# Patient Record
Sex: FEMALE | Race: BLACK OR AFRICAN AMERICAN | NOT HISPANIC OR LATINO | Employment: UNEMPLOYED | URBAN - METROPOLITAN AREA
[De-identification: names, ages, dates, MRNs, and addresses within clinical notes are randomized per-mention and may not be internally consistent; named-entity substitution may affect disease eponyms.]

---

## 2017-11-18 ENCOUNTER — APPOINTMENT (EMERGENCY)
Dept: RADIOLOGY | Facility: HOSPITAL | Age: 52
End: 2017-11-18

## 2017-11-18 ENCOUNTER — HOSPITAL ENCOUNTER (EMERGENCY)
Facility: HOSPITAL | Age: 52
Discharge: HOME | End: 2017-11-18
Attending: EMERGENCY MEDICINE | Admitting: EMERGENCY MEDICINE

## 2017-11-18 VITALS
DIASTOLIC BLOOD PRESSURE: 121 MMHG | OXYGEN SATURATION: 100 % | RESPIRATION RATE: 18 BRPM | HEIGHT: 70 IN | WEIGHT: 175 LBS | HEART RATE: 81 BPM | BODY MASS INDEX: 25.05 KG/M2 | TEMPERATURE: 98.1 F | SYSTOLIC BLOOD PRESSURE: 187 MMHG

## 2017-11-18 DIAGNOSIS — S99.912A ANKLE INJURY, LEFT, INITIAL ENCOUNTER: ICD-10-CM

## 2017-11-18 DIAGNOSIS — S89.92XA INJURY OF LEFT KNEE, INITIAL ENCOUNTER: ICD-10-CM
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
## 2017-11-18 DIAGNOSIS — S93.409A ANKLE SPRAIN: ICD-10-CM

## 2017-11-18 PROCEDURE — 99283 EMERGENCY DEPT VISIT LOW MDM: CPT

## 2017-11-18 PROCEDURE — 73564 X-RAY EXAM KNEE 4 OR MORE: CPT

## 2017-11-18 PROCEDURE — 73610 X-RAY EXAM OF ANKLE: CPT

## 2017-11-18 RX ORDER — TRAMADOL HYDROCHLORIDE 50 MG/1
50 TABLET ORAL EVERY 6 HOURS PRN
Qty: 30 TABLET | Refills: 0 | Status: SHIPPED | OUTPATIENT
Start: 2017-11-18 | End: 2017-11-28

## 2017-11-18 NOTE — ED PROVIDER NOTES
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 and we were coming down steps and startled by a big squarrel jumped out and I slipped and fell down a couple of steps and I think I twisted my left ankle and landed on my left knee and since limping and pain got worse yesterday and today, I called out work yesterday as a result too, and been icing and aleve that has been giving me diarrhea too"  Pt deny other injury including head, neck, chestwall, abdomen, hip, back, pelvis  Pt admits arthro paracentesis of ;eft knee 5 years ago for 3x for unknown etiology of effusion  Pt with mild effusion of left knee that she states swelling down since with icing  Left latteral malleolus with mild swelling and tenderness without vo sign  I urge pt to file for workman's comp but she appears reluctant about that  None       Past Medical History:   Diagnosis Date    [facility] ([facility])     PE [facility]pulmonary thromboembolism) ([facility] [address] )        History reviewed  No pertinent surgical history  History reviewed  No pertinent family history  I have reviewed and agree with the history as documented  Social History   Substance Use Topics    Smoking status: Never Smoker    Smokeless tobacco: Never Used    Alcohol use No        Review of Systems   Constitutional: Negative  HENT: Negative  Eyes: Negative  Respiratory: Negative  Cardiovascular: Negative  Gastrointestinal: Negative  Genitourinary: Negative  Musculoskeletal: Positive for arthralgias and joint swelling  Negative for neck pain  Skin: Negative  Neurological: Negative          Physical Exam  ED Triage Vitals [11/18/17 0830]   Temperature Pulse Respirations Blood Pressure SpO2   98 1 °F (36 7 °C) 96 16 (!) 168/114 100 % Temp Source Heart Rate Source Patient Position - Orthostatic VS BP Location FiO2 (%)   Oral Monitor Sitting Right arm --      Pain Score       8           Orthostatic Vital Signs  Vitals:    11/18/17 0830 11/18/17 0917 11/18/17 1104   BP: (!) 168/114  (!) 187/121   Pulse: 96 87 81   Patient Position - Orthostatic VS: Sitting Sitting Sitting       Physical Exam   Constitutional: She is oriented to person, place, and time  No distress  HENT:   Head: Normocephalic and atraumatic  Eyes: EOM are normal  Pupils are equal, round, and reactive to light  Neck: Normal range of motion  Neck supple  Pulmonary/Chest: Effort normal  No respiratory distress  Abdominal: Soft  There is no guarding  Musculoskeletal: She exhibits edema and tenderness  Legs:  Neurological: She is alert and oriented to person, place, and time  Skin: Skin is warm  She is not diaphoretic  ED Medications  Medications - No data to display    Diagnostic Studies  Results Reviewed     None                 XR ankle 3+ views LEFT   ED Interpretation by Royal Rafael PA-C (11/18 1140)   No fx      XR knee 4+ views left injury   ED Interpretation by Royal Rafael PA-C (11/18 1139)   No fx                 Procedures  Procedures       Phone Contacts  ED Phone Contact    ED Course  ED Course as of Nov 18 1144   Sat Nov 18, 2017   1040 XR knee 4+ views left injury   1128 XR knee 4+ views left injury                               MDM  Number of Diagnoses or Management Options  Diagnosis management comments: She states she has crutches from an old sports injury she will intend to use          Amount and/or Complexity of Data Reviewed  Tests in the radiology section of CPT®: ordered and reviewed      CritCare Time    Disposition  Final diagnoses:   Fall (on) (from) other stairs and steps, initial encounter   Injury of left knee, initial encounter   Ankle injury, left, initial encounter   Ankle sprain     Time reflects when diagnosis was documented in both MDM as applicable and the Disposition within this note     Time User Action Codes Description Comment    11/18/2017 11:41 AM Lin Theodros Add [H59  CERJ] Fall, initial encounter     11/18/2017 11:41 AM Lin Theodros Add [W10 8XXA] Fall (on) (from) other stairs and steps, initial encounter     11/18/2017 11:41 AM Lin Theodros Modify [W10 8XXA] Fall (on) (from) other stairs and steps, initial encounter     11/18/2017 11:41 AM Lin Theyudiros Remove [X79  TRGQ] Fall, initial encounter     11/18/2017 11:42 AM Lin Theodros Add [J14 34PU] Injury of left knee, initial encounter     11/18/2017 11:42 AM Lin Theodros Add [S99 912A] Ankle injury, left, initial encounter     11/18/2017 11:42 AM Lin Theyudiros Add [S93 409A] Ankle sprain       ED Disposition     ED Disposition Condition Comment    Discharge  [facility] discharge to home/self care  Condition at discharge: Stable        Follow-up Information    None       Patient's Medications    No medications on file     No discharge procedures on file      ED Provider  Electronically Signed by           Reggie Flores PA-C  11/18/17 9857

## 2017-11-18 NOTE — DISCHARGE INSTRUCTIONS
Ankle Sprain   WHAT YOU NEED TO KNOW:   An ankle sprain happens when 1 or more ligaments in your ankle joint stretch or tear  Ligaments are tough tissues that connect bones  Ligaments support your joints and keep your bones in place  DISCHARGE INSTRUCTIONS:   Return to the emergency department if:   · You have severe pain in your ankle  · Your foot or toes are cold or numb  · Your ankle becomes more weak or unstable (wobbly)  · You are unable to put any weight on your ankle or foot  · Your swelling has increased or returned  Contact your healthcare provider if:   · Your pain does not go away, even after treatment  · You have questions or concerns about your condition or care  Medicines: You may need any of the following:  · NSAIDs , such as ibuprofen, help decrease swelling, pain, and fever  This medicine is available with or without a doctor's order  NSAIDs can cause stomach bleeding or kidney problems in certain people  If you take blood thinner medicine, always ask your healthcare provider if NSAIDs are safe for you  Always read the medicine label and follow directions  · Acetaminophen  decreases pain  It is available without a doctor's order  Ask how much to take and how often to take it  Follow directions  Acetaminophen can cause liver damage if not taken correctly  · Prescription pain medicine  may be given  Ask how to take this medicine safely  · Take your medicine as directed  Contact your healthcare provider if you think your medicine is not helping or if you have side effects  Tell him or her if you are allergic to any medicine  Keep a list of the medicines, vitamins, and herbs you take  Include the amounts, and when and why you take them  Bring the list or the pill bottles to follow-up visits  Carry your medicine list with you in case of an emergency    Self care:   · Use support devices,  such as a brace, cast, or splint, may be needed to limit your movement and protect your joint  You may need to use crutches to decrease your pain as you move around  · Go to physical therapy as directed  A physical therapist teaches you exercises to help improve movement and strength, and to decrease pain  · Rest  your ankle so that it can heal  Return to normal activities as directed  · Apply ice on your ankle for 15 to 20 minutes every hour or as directed  Use an ice pack, or put crushed ice in a plastic bag  Cover it with a towel  Ice helps prevent tissue damage and decreases swelling and pain  · Compress  your ankle  Ask if you should wrap an elastic bandage around your injured ligament  An elastic bandage provides support and helps decrease swelling and movement so your joint can heal  Wear as long as directed  · Elevate  your ankle above the level of your heart as often as you can  This will help decrease swelling and pain  Prop your ankle on pillows or blankets to keep it elevated comfortably  Prevent another ankle sprain:   · Let your ankle heal   Find out how long your ligament needs to heal  Do not do any physical activity until your healthcare provider says it is okay  If you start activity too soon, you may develop a more serious injury  · Always warm up and stretch  before you exercise or play sports  · Use the right equipment  Always wear shoes that fit well and are made for the activity that you are doing  You may also need ankle supports, elbow and knee pads, or braces  Follow up with your healthcare provider as directed:  Write down your questions so you remember to ask them during your visits  © 2017 [address] Information is for End User's use only and may not be sold, redistributed or otherwise used for commercial purposes  All illustrations and images included in CareNotes® are the copyrighted property of [employer]  or [employer]  The above information is an  only   It is not intended as medical advice for individual conditions or treatments  Talk to your doctor, nurse or pharmacist before following any medical regimen to see if it is safe and effective for you  Tramadol (By mouth)   Tramadol (TRAM-a-dol)  Treats moderate to severe pain  This medicine is a narcotic pain reliever  Brand Name(s): ConZip, FusePaq Synapryn, Ryzolt, Ultram, Ultram ER, traMADol HCl   There may be other brand names for this medicine  When This Medicine Should Not Be Used: This medicine is not right for everyone  Do not use if you had an allergic reaction to tramadol or other narcotic medicine, or if you have stomach or bowel blockage (including paralytic ileus)  How to Use This Medicine:   Long Acting Capsule, Liquid, Tablet, Dissolving Tablet, Long Acting Tablet  · Take your medicine as directed  Your dose may need to be changed several times to find what works best for you  · Make sure your hands are dry before you handle the disintegrating tablet  Peel back the foil from the blister pack, then remove the tablet  Do not push the tablet through the foil  Place the tablet in your mouth  After it has melted, swallow or take a drink of water  · Swallow the extended-release tablet whole  Do not crush, break, or chew it  · Drink plenty of liquids to help avoid constipation  · This medicine should come with a Medication Guide  Ask your pharmacist for a copy if you do not have one  · Missed dose: Take a dose as soon as you remember  If it is almost time for your next dose, wait until then and take a regular dose  Do not take extra medicine to make up for a missed dose  · Store the medicine in a closed container at room temperature, away from heat, moisture, and direct light  Drugs and Foods to Avoid:   Ask your doctor or pharmacist before using any other medicine, including over-the-counter medicines, vitamins, and herbal products    · Do not use this medicine if you are using or have used an MAO inhibitor within the past 14 days  · Some medicines can affect how tramadol works  Tell your doctor if you are using any of the following:  ¨ Carbamazepine, cyclobenzaprine, digoxin, erythromycin, ketoconazole, lithium, mirtazapine, phenytoin, promethazine, rifampin, ritonavir, quinidine, or trazodone  ¨ Blood thinner (including warfarin)  ¨ Diuretic (water pill)  ¨ Medicine to treat depression (including bupropion, fluoxetine, paroxetine, quinidine)  ¨ Phenothiazine medicine  ¨ Triptan medicine for migraine headaches  · Tell your doctor if you use anything else that makes you sleepy  Some examples are allergy medicine, narcotic pain medicine, and alcohol  Tell your doctor if you are using a muscle relaxer  · Do not drink alcohol while you are using this medicine  Warnings While Using This Medicine:   · Tell your doctor if you are pregnant or breastfeeding, or if you have kidney disease, liver disease (including cirrhosis), gallstones, lung or breathing problems, pancreas problems, or a history of head injury, seizures, drug addiction, or depression or similar emotional problems  Tell your doctor if you have phenylketonuria  · This medicine may cause the following problems:  ¨ High risk of overdose, which can lead to death  ¨ Respiratory depression (serious breathing problem that can be life-threatening)  ¨ Serotonin syndrome (when used with certain medicines)  ¨ Unusual change in mood or behavior  · This medicine may make you dizzy, drowsy, or lightheaded  Do not drive or doing anything else that could be dangerous until you know how this medicine affects you  · This medicine can be habit-forming  Do not use more than your prescribed dose  Call your doctor if you think your medicine is not working  · Do not stop using this medicine suddenly  Your doctor will need to slowly decrease your dose before you stop it completely  · Tell any doctor or dentist who treats you that you are using this medicine    · This medicine may cause constipation, especially with long-term use  Ask your doctor if you should use a laxative to prevent and treat constipation  · This medicine could cause infertility  Talk with your doctor before using this medicine if you plan to have children  · Keep all medicine out of the reach of children  Never share your medicine with anyone  Possible Side Effects While Using This Medicine:   Call your doctor right away if you notice any of these side effects:  · Allergic reaction: Itching or hives, swelling in your face or hands, swelling or tingling in your mouth or throat, chest tightness, trouble breathing  · Anxiety, restlessness, fast heartbeat, fever, sweating, muscle spasms, nausea, vomiting, diarrhea, seeing or hearing things that are not there  · Blistering, peeling, red skin rash  · Blue lips, fingernails, or skin  · Extreme dizziness, drowsiness, or weakness, shallow breathing, slow heartbeat, seizures, and cold, clammy skin  · Lightheadedness, dizziness, fainting  · Seizures  · Unusual mood or behavior, thoughts of killing yourself or others  · Trouble breathing  If you notice these less serious side effects, talk with your doctor:   · Constipation, loss of appetite, stomach upset  · Dry mouth  · Headache  If you notice other side effects that you think are caused by this medicine, tell your doctor  Call your doctor for medical advice about side effects  You may report side effects to FDA at 9-893-FDA-2530  © 2017 [address] Information is for End User's use only and may not be sold, redistributed or otherwise used for commercial purposes  The above information is an  only  It is not intended as medical advice for individual conditions or treatments  Talk to your doctor, nurse or pharmacist before following any medical regimen to see if it is safe and effective for you

## 2017-12-15 ENCOUNTER — HOSPITAL ENCOUNTER (OUTPATIENT)
Facility: HOSPITAL | Age: 52
Setting detail: OBSERVATION
Discharge: HOME | End: 2017-12-16
Attending: EMERGENCY MEDICINE | Admitting: EMERGENCY MEDICINE
Payer: COMMERCIAL

## 2017-12-15 ENCOUNTER — APPOINTMENT (EMERGENCY)
Dept: RADIOLOGY | Facility: HOSPITAL | Age: 52
End: 2017-12-15
Payer: COMMERCIAL
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
## 2017-12-15 PROBLEM — J38.00 VOCAL CORD PARALYSIS: Status: ACTIVE | Noted: 2017-12-15

## 2017-12-15 LAB
BASE EXCESS BLDA CALC-SCNC: 2 MMOL/L (ref -2–3)
CA-I BLD-SCNC: 1.24 MMOL/L (ref 1.12–1.32)
GLUCOSE SERPL-MCNC: 101 MG/DL (ref 65–140)
HCO3 BLDA-SCNC: 28 MMOL/L (ref 24–30)
HCT VFR BLD CALC: 34 % (ref 34.8–46.1)
HGB BLDA-MCNC: 11.6 G/DL (ref 11.5–15.4)
HOLD SPECIMEN: NORMAL
PCO2 BLD: 29 MMOL/L (ref 21–32)
PCO2 BLD: 48.2 MM HG (ref 42–50)
PH BLD: 7.37 [PH] (ref 7.3–7.4)
PO2 BLD: 18 MM HG (ref 35–45)
POTASSIUM BLD-SCNC: 3.7 MMOL/L (ref 3.5–5.3)
SAO2 % BLD FROM PO2: 26 % (ref 95–98)
SODIUM BLD-SCNC: 146 MMOL/L (ref 136–145)
SPECIMEN SOURCE: ABNORMAL

## 2017-12-15 PROCEDURE — 84132 ASSAY OF SERUM POTASSIUM: CPT

## 2017-12-15 PROCEDURE — 71010 HB CHEST X-RAY 1 VIEW FRONTAL: CPT

## 2017-12-15 PROCEDURE — 85014 HEMATOCRIT: CPT

## 2017-12-15 PROCEDURE — 82803 BLOOD GASES ANY COMBINATION: CPT

## 2017-12-15 PROCEDURE — 84295 ASSAY OF SERUM SODIUM: CPT

## 2017-12-15 PROCEDURE — 74177 CT ABD & PELVIS W/CONTRAST: CPT

## 2017-12-15 PROCEDURE — 72125 CT NECK SPINE W/O DYE: CPT

## 2017-12-15 PROCEDURE — 71260 CT THORAX DX C+: CPT

## 2017-12-15 PROCEDURE — 82330 ASSAY OF CALCIUM: CPT

## 2017-12-15 PROCEDURE — 36415 COLL VENOUS BLD VENIPUNCTURE: CPT | Performed by: EMERGENCY MEDICINE

## 2017-12-15 PROCEDURE — 82947 ASSAY GLUCOSE BLOOD QUANT: CPT

## 2017-12-15 PROCEDURE — 96374 THER/PROPH/DIAG INJ IV PUSH: CPT

## 2017-12-15 PROCEDURE — 70450 CT HEAD/BRAIN W/O DYE: CPT

## 2017-12-15 PROCEDURE — 99285 EMERGENCY DEPT VISIT HI MDM: CPT

## 2017-12-15 RX ORDER — AMOXICILLIN 250 MG
2 CAPSULE ORAL DAILY
Status: DISCONTINUED | OUTPATIENT
Start: 2017-12-16 | End: 2017-12-16 | Stop reason: HOSPADM

## 2017-12-15 RX ORDER — METHOCARBAMOL 500 MG/1
500 TABLET, FILM COATED ORAL EVERY 6 HOURS SCHEDULED
Status: DISCONTINUED | OUTPATIENT
Start: 2017-12-15 | End: 2017-12-16 | Stop reason: HOSPADM

## 2017-12-15 RX ORDER — IBUPROFEN 600 MG/1
600 TABLET ORAL EVERY 6 HOURS SCHEDULED
Status: DISCONTINUED | OUTPATIENT
Start: 2017-12-15 | End: 2017-12-16 | Stop reason: HOSPADM

## 2017-12-15 RX ORDER — SODIUM CHLORIDE 9 MG/ML
125 INJECTION, SOLUTION INTRAVENOUS CONTINUOUS
Status: DISCONTINUED | OUTPATIENT
Start: 2017-12-15 | End: 2017-12-16 | Stop reason: HOSPADM

## 2017-12-15 RX ORDER — OXYCODONE HYDROCHLORIDE 5 MG/1
5 TABLET ORAL EVERY 4 HOURS PRN
Status: DISCONTINUED | OUTPATIENT
Start: 2017-12-15 | End: 2017-12-16 | Stop reason: HOSPADM

## 2017-12-15 RX ORDER — ACETAMINOPHEN 325 MG/1
650 TABLET ORAL EVERY 6 HOURS SCHEDULED
Status: DISCONTINUED | OUTPATIENT
Start: 2017-12-15 | End: 2017-12-16 | Stop reason: HOSPADM

## 2017-12-15 RX ORDER — ONDANSETRON 2 MG/ML
4 INJECTION INTRAMUSCULAR; INTRAVENOUS EVERY 4 HOURS PRN
Status: DISCONTINUED | OUTPATIENT
Start: 2017-12-15 | End: 2017-12-16 | Stop reason: HOSPADM

## 2017-12-15 RX ORDER — DOCUSATE SODIUM 100 MG/1
100 CAPSULE, LIQUID FILLED ORAL 2 TIMES DAILY
Status: DISCONTINUED | OUTPATIENT
Start: 2017-12-15 | End: 2017-12-16 | Stop reason: HOSPADM

## 2017-12-15 RX ADMIN — SODIUM CHLORIDE 125 ML/HR: 0.9 INJECTION, SOLUTION INTRAVENOUS at 19:15

## 2017-12-15 RX ADMIN — IOHEXOL 100 ML: 350 INJECTION, SOLUTION INTRAVENOUS at 17:05

## 2017-12-15 RX ADMIN — SODIUM CHLORIDE 1000 ML: 0.9 INJECTION, SOLUTION INTRAVENOUS at 16:51

## 2017-12-15 RX ADMIN — OXYCODONE HYDROCHLORIDE 5 MG: 5 TABLET ORAL at 20:29
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
## 2017-12-16 ENCOUNTER — GENERIC CONVERSION - ENCOUNTER (OUTPATIENT)
Dept: OTHER | Facility: OTHER | Age: 52
End: 2017-12-16

## 2017-12-16 VITALS
TEMPERATURE: 98.2 F | OXYGEN SATURATION: 100 % | RESPIRATION RATE: 16 BRPM | DIASTOLIC BLOOD PRESSURE: 95 MMHG | HEART RATE: 79 BPM | WEIGHT: 175 LBS | HEIGHT: 70 IN | BODY MASS INDEX: 25.05 KG/M2 | SYSTOLIC BLOOD PRESSURE: 133 MMHG

## 2017-12-16 PROBLEM — S06.0X9A CONCUSSION: Status: ACTIVE | Noted: 2017-12-16

## 2017-12-16 PROCEDURE — 97162 PT EVAL MOD COMPLEX 30 MIN: CPT

## 2017-12-16 PROCEDURE — G8988 SELF CARE GOAL STATUS: HCPCS

## 2017-12-16 PROCEDURE — G8987 SELF CARE CURRENT STATUS: HCPCS

## 2017-12-16 PROCEDURE — G8979 MOBILITY GOAL STATUS: HCPCS

## 2017-12-16 PROCEDURE — 97166 OT EVAL MOD COMPLEX 45 MIN: CPT

## 2017-12-16 PROCEDURE — G8978 MOBILITY CURRENT STATUS: HCPCS

## 2017-12-16 PROCEDURE — 97532 HB COGNITIVE SKILLS DEVELOPMENT: CPT

## 2017-12-16 PROCEDURE — 97530 THERAPEUTIC ACTIVITIES: CPT

## 2017-12-16 RX ORDER — BUTALBITAL, ACETAMINOPHEN AND CAFFEINE 50; 325; 40 MG/1; MG/1; MG/1
1 TABLET ORAL ONCE
Qty: 30 TABLET | Refills: 0 | Status: SHIPPED | OUTPATIENT
Start: 2017-12-16 | End: 2017-12-16

## 2017-12-16 RX ORDER — BUTALBITAL, ACETAMINOPHEN AND CAFFEINE 50; 325; 40 MG/1; MG/1; MG/1
1 TABLET ORAL ONCE
Status: DISCONTINUED | OUTPATIENT
Start: 2017-12-16 | End: 2017-12-16 | Stop reason: HOSPADM

## 2017-12-16 RX ADMIN — OXYCODONE HYDROCHLORIDE 5 MG: 5 TABLET ORAL at 13:03

## 2017-12-16 RX ADMIN — OXYCODONE HYDROCHLORIDE 5 MG: 5 TABLET ORAL at 04:01

## 2017-12-16 RX ADMIN — OXYCODONE HYDROCHLORIDE 5 MG: 5 TABLET ORAL at 08:22
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
## 2017-12-16 NOTE — ASSESSMENT & PLAN NOTE
-Back pain and muscular spasm, patient states that muscle relaxers have not worked for he in the past  Pain control with tylenol, ibuprofen
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
## 2017-12-16 NOTE — PLAN OF CARE
Problem: Potential for Falls  Goal: Patient will remain free of falls  INTERVENTIONS:  - Assess patient frequently for physical needs  -  Identify cognitive and physical deficits and behaviors that affect risk of falls    -  [place] fall precautions as indicated by assessment   - Educate patient/family on patient safety including physical limitations  - Instruct patient to call for assistance with activity based on assessment  - Modify environment to reduce risk of injury  - Consider OT/PT consult to assist with strengthening/mobility    Outcome: Progressing      Problem: PAIN - ADULT  Goal: Verbalizes/displays adequate comfort level or baseline comfort level  Interventions:  - Encourage patient to monitor pain and request assistance  - Assess pain using appropriate pain scale  - Administer analgesics based on type and severity of pain and evaluate response  - Implement non-pharmacological measures as appropriate and evaluate response  - Consider cultural and social influences on pain and pain management  - Notify physician/advanced practitioner if interventions unsuccessful or patient reports new pain   Outcome: Progressing      Problem: INFECTION - ADULT  Goal: Absence or prevention of progression during hospitalization  INTERVENTIONS:  - Assess and monitor for signs and symptoms of infection  - Monitor lab/diagnostic results  - Monitor all insertion sites, i e  indwelling lines, tubes, and drains  - Monitor endotracheal (as able) and nasal secretions for changes in amount and color  - [place] appropriate cooling/warming therapies per order  - Administer medications as ordered  - Instruct and encourage patient and family to use good hand hygiene technique  - Identify and instruct in appropriate isolation precautions for identified infection/condition   Outcome: Progressing      Problem: SAFETY ADULT  Goal: Maintain or return to baseline ADL function  INTERVENTIONS:  -  Assess patient's ability to carry out ADLs; assess patient's baseline for ADL function and identify physical deficits which impact ability to perform ADLs (bathing, care of mouth/teeth, toileting, grooming, dressing, etc )  - Assess/evaluate cause of self-care deficits   - Assess range of motion  - Assess patient's mobility; develop plan if impaired  - Assess patient's need for assistive devices and provide as appropriate  - Encourage maximum independence but intervene and supervise when necessary  ¯ Involve family in performance of ADLs  ¯ Assess for home care needs following discharge   ¯ Request OT consult to assist with ADL evaluation and planning for discharge  ¯ Provide patient education as appropriate   Outcome: Progressing    Goal: Maintain or return mobility status to optimal level  INTERVENTIONS:  - Assess patient's baseline mobility status (ambulation, transfers, stairs, etc )    - Identify cognitive and physical deficits and behaviors that affect mobility  - Identify mobility aids required to assist with transfers and/or ambulation (gait belt, sit-to-stand, lift, walker, cane, etc )  - [place] fall precautions as indicated by assessment  - Record patient progress and toleration of activity level on Mobility SBAR; progress patient to next Phase/Stage  - Instruct patient to call for assistance with activity based on assessment  - Request Rehabilitation consult to assist with strengthening/weightbearing, etc    Outcome: Progressing    Goal: Patient will remain free of falls  INTERVENTIONS:  - Assess patient frequently for physical needs  -  Identify cognitive and physical deficits and behaviors that affect risk of falls    -  [place] fall precautions as indicated by assessment   - Educate patient/family on patient safety including physical limitations  - Instruct patient to call for assistance with activity based on assessment  - Modify environment to reduce risk of injury  - Consider OT/PT consult to assist with strengthening/mobility Outcome: Progressing      Problem: DISCHARGE PLANNING  Goal: Discharge to home or other facility with appropriate resources  INTERVENTIONS:  - Identify barriers to discharge w/patient and caregiver  - Arrange for needed discharge resources and transportation as appropriate  - Identify discharge learning needs (meds, wound care, etc )  - Arrange for interpretive services to assist at discharge as needed  - Refer to Case Management Department for coordinating discharge planning if the patient needs post-hospital services based on physician/advanced practitioner order or complex needs related to functional status, cognitive ability, or social support system   Outcome: Progressing      Problem: Knowledge Deficit  Goal: Patient/family/caregiver demonstrates understanding of disease process, treatment plan, medications, and discharge instructions  Complete learning assessment and assess knowledge base    Interventions:  - Provide teaching at level of understanding  - Provide teaching via preferred learning methods   Outcome: Progressing
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
## 2017-12-16 NOTE — PLAN OF CARE
Problem: PHYSICAL THERAPY ADULT  Goal: Performs mobility at highest level of function for planned discharge setting  See evaluation for individualized goals  Treatment/Interventions: Functional transfer training, LE strengthening/ROM, Elevations, Therapeutic exercise, Cognitive reorientation, Endurance training, Patient/family training, Equipment eval/education, Bed mobility, Gait training, Compensatory technique education, Continued evaluation, Spoke to nursing          See flowsheet documentation for full assessment, interventions and recommendations  Prognosis: Good  Problem List: Decreased endurance, Impaired balance, Decreased mobility, Decreased coordination, Decreased cognition, Decreased safety awareness, Impaired judgement  Assessment: pt exhibits stable amb on level surface, and on steps - supervsed up/down steps w alternating step pattern rail support; pt educated on safe mob, post concussive precautions and symptoms; at this time pt expressed understanding of symptoms and will initiate call to MD if symptoms persist; rec outpt PT for bal dysfunction for persistent symptoms        Recommendation: Outpatient PT (for post concussive bal dysf if symptoms persist)     PT - OK to Discharge: Yes    See flowsheet documentation for full assessment
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
## 2017-12-16 NOTE — PROGRESS NOTES
Pt is refusing atc meds including robaxin, tylenol and motrin  Pt states  Will only take tramadol and oxycodone because those medicine she knows  States in past motrin and tylenol has never helped   Explained why they are ordered atc and also that the muscle relaxer will help with neck pain the patient still refusing

## 2017-12-16 NOTE — PLAN OF CARE
DISCHARGE PLANNING     Discharge to home or other facility with appropriate resources Progressing        INFECTION - ADULT     Absence or prevention of progression during hospitalization Progressing        Knowledge Deficit     Patient/family/caregiver demonstrates understanding of disease process, treatment plan, medications, and discharge instructions Progressing        PAIN - ADULT     Verbalizes/displays adequate comfort level or baseline comfort level Progressing        Potential for Falls     Patient will remain free of falls Progressing        SAFETY ADULT     Maintain or return to baseline ADL function Progressing     Maintain or return mobility status to optimal level Progressing     Patient will remain free of falls Progressing

## 2017-12-16 NOTE — ASSESSMENT & PLAN NOTE
-incidental finding on C-spine CT suggestive of possible vocal cord paralysis   Patient speaking clearly, f/u with ENT as outpatient
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
## 2017-12-16 NOTE — PHYSICAL THERAPY NOTE
PT Progress Note     12/16/17 1415   Pain Assessment   Pain Assessment 0-10   Pain Score 3   Pain Location Generalized   Hospital Pain Intervention(s) Ambulation/increased activity   Response to Interventions unchanged   General   Chart Reviewed Yes   Response to Previous Treatment Patient with no complaints from previous session  Family/Caregiver Present No   Cognition   Arousal/Participation Alert   Attention Attends with cues to redirect   Orientation Level Oriented to person;Oriented to place;Oriented to situation   Following Commands Follows multistep commands with increased time or repetition   Subjective   Subjective pt tx for additional session in anticipation of d/c home   Transfers   Sit to Stand 6  Modified independent   Stand to Sit 6  Modified independent   Stand pivot 7  Independent   Ambulation/Elevation   Gait pattern WNL   Gait Assistance 7  Independent   Assistive Device None   Distance 100 ft   Stair Management Assistance 5  Supervision   Stair Management Technique Alternating pattern; Two rails   Balance   Static Standing Good   Dynamic Standing Fair   Activity Tolerance   Activity Tolerance Patient tolerated treatment well   Nurse Made Aware yes   Assessment   Prognosis Good   Problem List Decreased endurance; Impaired balance;Decreased mobility; Decreased coordination;Decreased cognition;Decreased safety awareness; Impaired judgement   Assessment pt exhibits stable amb on level surface, and on steps - supervsed up/down steps w alternating step pattern rail support; pt educated on safe mob, post concussive precautions and symptoms; at this time pt expressed understanding of symptoms and will initiate call to MD if symptoms persist; rec outpt PT for bal dysfunction for persistent symptoms   Goals   Patient Goals go home   STG Expiration Date 12/26/17   Plan   Treatment/Interventions Functional transfer training;LE strengthening/ROM; Elevations; Therapeutic exercise; Endurance training;Cognitive reorientation;Patient/family training;Equipment eval/education; Compensatory technique education;Continued evaluation;Spoke to nursing   Progress Progressing toward goals   Recommendation   Recommendation Outpatient PT  (for post concussive bal dysf if symptoms persist)   PT - OK to Discharge Yes

## 2018-01-23 NOTE — MISCELLANEOUS
Message  Return to work or school:   Bella Dominique is under my professional care  She was seen in my office on 12/16/17  She is able to return to work on  12/18/17  No driving until 8/4/96          Signatures   Electronically signed by : Kamari Munoz, [facility]; Dec 19 2017  4:31PM EST                       (Author)

## 2018-05-20 ENCOUNTER — APPOINTMENT (EMERGENCY)
Dept: RADIOLOGY | Facility: HOSPITAL | Age: 53
End: 2018-05-20

## 2018-05-20 ENCOUNTER — HOSPITAL ENCOUNTER (EMERGENCY)
Facility: HOSPITAL | Age: 53
Discharge: HOME | End: 2018-05-20
Attending: EMERGENCY MEDICINE | Admitting: EMERGENCY MEDICINE

## 2018-05-20 VITALS
HEIGHT: 70 IN | DIASTOLIC BLOOD PRESSURE: 99 MMHG | OXYGEN SATURATION: 100 % | HEART RATE: 108 BPM | RESPIRATION RATE: 18 BRPM | BODY MASS INDEX: 24.91 KG/M2 | TEMPERATURE: 98.3 F | WEIGHT: 174 LBS | SYSTOLIC BLOOD PRESSURE: 158 MMHG

## 2018-05-20 DIAGNOSIS — S93.402A LEFT ANKLE SPRAIN: Primary | ICD-10-CM

## 2018-05-20 PROCEDURE — 73610 X-RAY EXAM OF ANKLE: CPT

## 2018-05-20 PROCEDURE — 99283 EMERGENCY DEPT VISIT LOW MDM: CPT

## 2018-05-20 RX ORDER — IBUPROFEN 600 MG/1
600 TABLET ORAL ONCE
Status: COMPLETED | OUTPATIENT
Start: 2018-05-20 | End: 2018-05-20

## 2018-05-20 RX ADMIN — IBUPROFEN 600 MG: 600 TABLET ORAL at 19:53
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
## 2018-05-21 NOTE — ED PROVIDER NOTES
History  Chief Complaint   Patient presents with    Fall     patient steps she fell down 2 steps carrying laundry  denies hitting head  Pt c/o right ankle pain  Swelling and pain noted to left ankle     53-year-old female presents to the emergency department for evaluation of left ankle pain after falling down 2 steps  She states that she missed her step and fell onto her left ankle and heard a snap  Unable to bear weight on that left lower extremity after the fall  Has not taken any medication for pain relief  She states pain is located worse along the posterior all lateral malleolus  She denies any her head, denies loss of consciousness, chest pain, shortness of breath, nausea, vomiting, abdominal pain, dysuria constipation or diarrhea  None       Past Medical History:   Diagnosis Date    [facility]     PE [facility]pulmonary thromboembolism) ([facility] )        History reviewed  No pertinent surgical history  History reviewed  No pertinent family history  I have reviewed and agree with the history as documented  Social History   Substance Use Topics    Smoking status: Never Smoker    Smokeless tobacco: Never Used    Alcohol use No        Review of Systems   Constitutional: Negative for appetite change, chills, diaphoresis, fatigue and fever  HENT: Negative for congestion, ear discharge, ear pain, hearing loss, postnasal drip, rhinorrhea, sneezing and sore throat  Eyes: Negative for pain, discharge and redness  Respiratory: Negative for choking, chest tightness, shortness of breath, wheezing and stridor  Cardiovascular: Negative for chest pain and palpitations  Gastrointestinal: Negative for abdominal distention, abdominal pain, blood in stool, constipation, diarrhea, nausea and vomiting  Genitourinary: Negative for decreased urine volume, difficulty urinating, dysuria, flank pain, frequency and hematuria     Musculoskeletal: Positive for arthralgias and joint swelling  Negative for gait problem and neck pain  Skin: Negative for color change, pallor and rash  Allergic/Immunologic: Negative for environmental allergies, food allergies and immunocompromised state  Neurological: Negative for dizziness, seizures, weakness, light-headedness, numbness and headaches  Hematological: Negative for adenopathy  Does not bruise/bleed easily  Psychiatric/Behavioral: Negative for agitation and behavioral problems  Physical Exam  ED Triage Vitals [05/20/18 1934]   Temperature Pulse Respirations Blood Pressure SpO2   98 3 °F (36 8 °C) (!) 108 18 158/99 100 %      Temp Source Heart Rate Source Patient Position - Orthostatic VS BP Location FiO2 (%)   Oral Monitor Sitting Right arm --      Pain Score       Worst Possible Pain           Orthostatic Vital Signs  Vitals:    05/20/18 1934   BP: 158/99   Pulse: (!) 108   Patient Position - Orthostatic VS: Sitting       Physical Exam   Constitutional: She is oriented to person, place, and time  She appears well-developed and well-nourished  HENT:   Head: Normocephalic and atraumatic  Nose: Nose normal    Mouth/Throat: Oropharynx is clear and moist    Eyes: Conjunctivae and EOM are normal  Pupils are equal, round, and reactive to light  Neck: Normal range of motion  Neck supple  Cardiovascular: Normal rate, regular rhythm and normal heart sounds  Exam reveals no gallop and no friction rub  No murmur heard  Pulmonary/Chest: Effort normal and breath sounds normal    Abdominal: Soft  Bowel sounds are normal  She exhibits no distension  There is no tenderness  There is no rebound and no guarding  Musculoskeletal: Normal range of motion  She exhibits edema and tenderness  She exhibits no deformity  Neurological: She is alert and oriented to person, place, and time  She has normal reflexes  Skin: Skin is warm and dry  No erythema  No pallor  Psychiatric: She has a normal mood and affect   Her behavior is normal  Nursing note and vitals reviewed  ED Medications  Medications   ibuprofen (MOTRIN) tablet 600 mg (600 mg Oral Given 5/20/18 1953)       Diagnostic Studies  Results Reviewed     None                 XR ankle 3+ views LEFT   Final Result by Pam Patel MD (05/21 0901)      No acute osseous abnormality  Soft tissue swelling  Workstation performed: [id number]               Procedures  Procedures      Phone Consults  ED Phone Contact    ED Course                               MDM  Number of Diagnoses or Management Options  Left ankle sprain:   Diagnosis management comments: 53-year-old female with left ankle pain after fall  I will x-ray left ankle and apply splint and ibuprofen for pain control and have patient follow up with Podiatry  CritCare Time    Disposition  Final diagnoses:   Left ankle sprain     Time reflects when diagnosis was documented in both MDM as applicable and the Disposition within this note     Time User Action Codes Description Comment    5/20/2018  8:23 PM Siddhartha Villarreal Add [[id number] [id number]] Left ankle sprain       ED Disposition     ED Disposition Condition Comment    Discharge  Rica Saldaña discharge to home/self care  Condition at discharge: Stable        Follow-up Information     Follow up With Specialties Details Why MD [facility] In 1 week  [address]  [phone]            There are no discharge medications for this patient  No discharge procedures on file  ED Provider  Attending physically available and evaluated Rica Saldaña  I managed the patient along with the ED Attending      Electronically Signed by         Marj Vincent MD  05/24/18 3179

## 2018-05-21 NOTE — DISCHARGE INSTRUCTIONS

## 2018-05-21 NOTE — ED ATTENDING ATTESTATION
Noreen Burris DO, saw and evaluated the patient  I have discussed the patient with the resident/non-physician practitioner and agree with the resident's/non-physician practitioner's findings, Plan of Care, and MDM as documented in the resident's/non-physician practitioner's note, except where noted  All available labs and Radiology studies were reviewed  At this point I agree with the current assessment done in the Emergency Department  I have conducted an independent evaluation of this patient a history and physical is as follows:      49 yo female presents for evaluation of acute onset L lateral ankle pain after she tripped down 2 steps  Pain 8/10 constant, non radiating, worse with weight bearing but able to ambulate  Denies focal weakness/numbness/tingling  Denies pain in the foot or knee  No TTP over fibular head  No TTP over base of 5th MT or navicular  Swelling and TTP over lateral malleolus/ATFL region  No TTP over medial malleolus  Imp: L ankle injury, swelling and pain likely sprain of ATFL plan: films, analgesia, splint  Offer crutches        Critical Care Time  CritCare Time    Procedures

## 2020-10-05 ENCOUNTER — APPOINTMENT (EMERGENCY)
Dept: RADIOLOGY | Facility: HOSPITAL | Age: 55
End: 2020-10-05

## 2020-10-05 ENCOUNTER — HOSPITAL ENCOUNTER (EMERGENCY)
Facility: HOSPITAL | Age: 55
Discharge: HOME | End: 2020-10-05
Attending: EMERGENCY MEDICINE

## 2020-10-05 VITALS
WEIGHT: 175 LBS | OXYGEN SATURATION: 93 % | TEMPERATURE: 98.9 F | SYSTOLIC BLOOD PRESSURE: 156 MMHG | HEART RATE: 90 BPM | DIASTOLIC BLOOD PRESSURE: 113 MMHG | BODY MASS INDEX: 25.05 KG/M2 | RESPIRATION RATE: 18 BRPM | HEIGHT: 70 IN

## 2020-10-05 DIAGNOSIS — S89.92XA INJURY OF LEFT KNEE, INITIAL ENCOUNTER: Primary | ICD-10-CM

## 2020-10-05 PROCEDURE — 99283 EMERGENCY DEPT VISIT LOW MDM: CPT

## 2020-10-05 PROCEDURE — 99284 EMERGENCY DEPT VISIT MOD MDM: CPT | Performed by: PHYSICIAN ASSISTANT

## 2020-10-05 PROCEDURE — 73564 X-RAY EXAM KNEE 4 OR MORE: CPT

## 2020-10-05 RX ORDER — NAPROXEN 500 MG/1
500 TABLET ORAL 2 TIMES DAILY WITH MEALS
Qty: 14 TABLET | Refills: 0 | Status: SHIPPED | OUTPATIENT
Start: 2020-10-05 | End: 2020-10-12

## 2020-10-05 RX ORDER — IBUPROFEN 200 MG
400 TABLET ORAL EVERY 6 HOURS PRN
COMMUNITY

## 2020-10-05 RX ORDER — OXYCODONE HYDROCHLORIDE AND ACETAMINOPHEN 5; 325 MG/1; MG/1
1 TABLET ORAL
Qty: 3 TABLET | Refills: 0 | Status: SHIPPED | OUTPATIENT
Start: 2020-10-05

## 2020-10-05 RX ORDER — OXYCODONE HYDROCHLORIDE 5 MG/1
5 TABLET ORAL ONCE
Status: COMPLETED | OUTPATIENT
Start: 2020-10-05 | End: 2020-10-05

## 2020-10-05 RX ORDER — IBUPROFEN 600 MG/1
600 TABLET ORAL ONCE
Status: COMPLETED | OUTPATIENT
Start: 2020-10-05 | End: 2020-10-05

## 2020-10-05 RX ADMIN — IBUPROFEN 600 MG: 600 TABLET, FILM COATED ORAL at 10:23

## 2020-10-05 RX ADMIN — OXYCODONE HYDROCHLORIDE 5 MG: 5 TABLET ORAL at 10:23

## 2022-05-15 ENCOUNTER — HOSPITAL ENCOUNTER (EMERGENCY)
Facility: HOSPITAL | Age: 57
Discharge: HOME | End: 2022-05-15
Attending: EMERGENCY MEDICINE

## 2022-05-15 ENCOUNTER — APPOINTMENT (EMERGENCY)
Dept: RADIOLOGY | Facility: HOSPITAL | Age: 57
End: 2022-05-15

## 2022-05-15 VITALS
HEART RATE: 86 BPM | DIASTOLIC BLOOD PRESSURE: 93 MMHG | OXYGEN SATURATION: 100 % | TEMPERATURE: 97.6 F | RESPIRATION RATE: 20 BRPM | SYSTOLIC BLOOD PRESSURE: 210 MMHG

## 2022-05-15 DIAGNOSIS — M25.562 ACUTE PAIN OF LEFT KNEE: Primary | ICD-10-CM

## 2022-05-15 PROCEDURE — 73564 X-RAY EXAM KNEE 4 OR MORE: CPT

## 2022-05-15 PROCEDURE — 99284 EMERGENCY DEPT VISIT MOD MDM: CPT | Performed by: EMERGENCY MEDICINE

## 2022-05-15 PROCEDURE — 99283 EMERGENCY DEPT VISIT LOW MDM: CPT

## 2022-05-15 RX ORDER — OXYCODONE HYDROCHLORIDE 5 MG/1
5 TABLET ORAL ONCE
Status: DISCONTINUED | OUTPATIENT
Start: 2022-05-15 | End: 2022-05-15 | Stop reason: HOSPADM

## 2022-05-15 RX ORDER — METHOCARBAMOL 500 MG/1
500 TABLET, FILM COATED ORAL 3 TIMES DAILY
Qty: 15 TABLET | Refills: 0 | Status: SHIPPED | OUTPATIENT
Start: 2022-05-15 | End: 2022-05-20

## 2022-05-15 RX ORDER — KETOROLAC TROMETHAMINE 30 MG/ML
15 INJECTION, SOLUTION INTRAMUSCULAR; INTRAVENOUS ONCE
Status: DISCONTINUED | OUTPATIENT
Start: 2022-05-15 | End: 2022-05-15 | Stop reason: HOSPADM

## 2022-05-15 NOTE — ED PROVIDER NOTES
History  Chief Complaint   Patient presents with    Knee Injury     Pt states the past 2 weeks that she fell onto her L knee      Alta Stroud is an 49y o  year old female with PMHx significant for hx of UE DVT and PE, who presents to the ED today with l knee pain for 2 weeks since a fall sustained when trying to change a lightbulb  Knee pain is exacerbated by movement and palpation and relieved by nothing  The pain is sharp in quality, does not radiate  The patient denies numbness or tingling, pain anywhere else in body  ROS otherwise negative  History provided by:  Medical records and patient   used: No        Prior to Admission Medications   Prescriptions Last Dose Informant Patient Reported? Taking?   ibuprofen (MOTRIN) 200 mg tablet  Self Yes No   Sig: Take 400 mg by mouth every 6 (six) hours as needed for mild pain   naproxen (NAPROSYN) 500 mg tablet   No No   Sig: Take 1 tablet (500 mg total) by mouth 2 (two) times a day with meals for 7 days   oxyCODONE-acetaminophen (PERCOCET) 5-325 mg per tablet   No No   Sig: Take 1 tablet by mouth daily at bedtime as needed for moderate pain for up to 3 dosesMax Daily Amount: 1 tablet      Facility-Administered Medications: None       Past Medical History:   Diagnosis Date    [facility]) [facility] )     PE [facility]pulmonary throm[facility]embolism) ([facility] )        History reviewed  No pertinent surgical history  History reviewed  No pertinent family history  I have reviewed and agree with the history as documented  E-Cigarette/Vaping     E-Cigarette/Vaping Substances     Social History     Tobacco Use    Smoking status: Never Smoker    Smokeless tobacco: Never Used   Substance Use Topics    Alcohol use: No    Drug use: No        Review of Systems   Reason unable to perform ROS: please see above for ROS  All other ROS negative         Physical Exam  ED Triage Vitals [05/15/22 1200]   Temperature Pulse Respirations Blood Pressure SpO2   97 6 °F (36 4 °C) 86 20 (!) 210/93 100 %      Temp src Heart Rate Source Patient Position - Orthostatic VS BP Location FiO2 (%)   -- Monitor -- -- --      Pain Score       10 - Worst Possible Pain             Orthostatic Vital Signs  Vitals:    05/15/22 1200   BP: (!) 210/93   Pulse: 86       Physical Exam  Vitals and nursing note reviewed  Constitutional:       General: She is not in acute distress  Appearance: She is well-developed  She is not diaphoretic  HENT:      Head: Normocephalic and atraumatic  Eyes:      General: No scleral icterus  Conjunctiva/sclera: Conjunctivae normal    Neck:      Vascular: No JVD  Trachea: No tracheal deviation  Cardiovascular:      Rate and Rhythm: Normal rate and regular rhythm  Pulmonary:      Effort: Pulmonary effort is normal  No respiratory distress  Abdominal:      General: There is no distension  Musculoskeletal:         General: No deformity  Cervical back: Neck supple  Skin:     General: Skin is warm and dry  Neurological:      Mental Status: She is alert  Psychiatric:         Behavior: Behavior normal          ED Medications  Medications   ketorolac (TORADOL) injection 15 mg (15 mg Intramuscular Not Given 5/15/22 1355)   oxyCODONE (ROXICODONE) IR tablet 5 mg (5 mg Oral Not Given 5/15/22 1356)       Diagnostic Studies  Results Reviewed     None                 XR knee 4+ vw left injury   Final Result by Rogers Sellers MD (05/15 1350)      No acute osseous abnormality  Workstation performed: [id number]               Procedures  Procedures      ED Course                             SBIRT 20yo+    Flowsheet Row Most Recent Value   SBIRT (23 yo +)    In order to provide better care to our patients, we are screening all of our patients for alcohol and drug use  Would it be okay to ask you these screening questions?  Unable to answer at this time Filed at: 05/15/2022 1202                [facility]  Number of Diagnoses or Management Options  Diagnosis management comments: Initial ED diagnostics to include x-ray left knee  Initial ddx includes but is not limited to: fx, dislocation, ligamentous injury, contusion  Anticipate ultimate dispo to be d/c with ortho f/u and supportive care  Amount and/or Complexity of Data Reviewed  Tests in the radiology section of CPT®: ordered and reviewed  Review and summarize past medical records: yes    Risk of Complications, Morbidity, and/or Mortality  Diagnostic procedures: minimal    Patient Progress  Patient progress: stable      Disposition  Final diagnoses:   Acute pain of left knee     Time reflects when diagnosis was documented in both MDM as applicable and the Disposition within this note     Time User Action Codes Description Comment    5/15/2022  1:01 PM Preethiki Candelaria Add [[id number] [id number]] Acute pain of left knee       ED Disposition     ED Disposition   Discharge    Condition   Stable    Date/Time   Sun May 15, 2022  1:00 PM    Comment   Brian Vale discharge to home/self care  Results of completed tests discussed  Return to ER precautions given, verbal and written, and questions answered satisfactorily                     Follow-up Information     Follow up With Specialties Details Why Contact Info Additional 1305 [facility] Call in 1 day For specialty evaluation and/or treatment [address] [place] [address], [address]  Use Entrance A           Discharge Medication List as of 5/15/2022  1:01 PM      CONTINUE these medications which have NOT CHANGED    Details   ibuprofen (MOTRIN) 200 mg tablet Take 400 mg by mouth every 6 (six) hours as needed for mild pain, Historical Med      naproxen (NAPROSYN) 500 mg tablet Take 1 tablet (500 mg total) by mouth 2 (two) times a day with meals for 7 days, Starting Mon 10/5/2020, Until Mon 10/12/2020, Normal      oxyCODONE-acetaminophen (PERCOCET) 5-325 mg per tablet Take 1 tablet by mouth daily at bedtime as needed for moderate pain for up to 3 dosesMax Daily Amount: 1 tablet, Starting Mon 10/5/2020, Normal           No discharge procedures on file  PDMP Review     None           ED Provider  Attending physically available and evaluated Sigmund Blizzard I managed the patient along with the ED Attending      Electronically Signed by         Catrina Diaz DO  05/15/22 6560

## 2022-05-15 NOTE — ED ATTENDING ATTESTATION
Final Diagnosis:  1  Acute pain of left knee      ED Course as of 05/17/22 1210   Sun May 15, 2022   1210 This is a 50 y/o F who fell 2 weeks ago and hit the knee  She's able to walk  She has a trip soon and wanted imaging to make sure there is no fx  Painful with movement  Able to bear weight  Going to [place]  No numbness/tingling  1211 General: VS reviewed  Appears in NAD  awake, alert  Well-nourished, well-developed  Appears stated age  Speaking normally in full sentences  Head: Normocephalic, atraumatic  Eyes: EOM-I  No diplopia  No hyphema  No subconjunctival hemorrhages  Symmetrical lids  ENT: Atraumatic external nose and ears  MMM  No malocclusion  No stridor  Normal phonation  No drooling  Normal swallowing  Neck: No JVD  CV: No pallor noted  Lungs:   No tachypnea  No respiratory distress  MSK:   FROM spontaneously  LEFT:  EHL/FHL/PF/DF//HF/HE 5/5  Knee: AD/PD/Varus/Valgus/Lachman 5/5 without demonstration of joint laxity but elicits pain on the medial aspect  Skin: Dry, intact  Neuro: Awake, alert, GCS15, CN II-XII grossly intact  Motor grossly intact  Psychiatric/Behavioral: Appropriate mood and affect   Exam: deferred     1211 A/P:  - knee pain (subacute)  XR, knee immobilizer, f/u ortho, NSAIDs  - reassuring exam       I, Lennox Kearns MD, saw and evaluated the patient  All available labs and X-rays were ordered by me or the resident and have been reviewed by myself  I discussed the patient with the resident / non-physician and agree with the resident's / non-physician practitioner's findings and plan as documented in the resident's / non-physician practicitioner's note, except where noted  At this point, I agree with the current assessment done in the ED  I was present during key portions of all procedures performed unless otherwise stated       Chief Complaint   Patient presents with    Knee Injury     Pt states the past 2 weeks that she fell onto her L knee     PMH:   has a past medical history of DVT (deep venous thrombosis) ([facility]) and PE (pulmonary thromboembolism) ([address] )  PSH:   has no past surgical history on file  Social:  Social History     Substance and Sexual Activity   Alcohol Use No     Social History     Tobacco Use   Smoking Status Never Smoker   Smokeless Tobacco Never Used     Social History     Substance and Sexual Activity   Drug Use No     PE:  Vitals:    05/15/22 1200   BP: (!) 210/93   Pulse: 86   Resp: 20   Temp: 97 6 °F (36 4 °C)   SpO2: 100%     - 13 point ROS was performed and all are normal unless stated in the history above  - Nursing note reviewed  Vitals reviewed  - Orders placed by myself and/or advanced practitioner / resident     - Previous chart was reviewed  - No language barrier    - History obtained from patient  - There are no limitations to the history obtained  - Critical care time: Not applicable for this patient  Code Status: No Order  Advance Directive and Living Will:      Power of :    POLST:      Medications - No data to display  XR knee 4+ vw left injury   Final Result      No acute osseous abnormality  Workstation performed: [id number]           Orders Placed This Encounter   Procedures    Crutches    XR knee 4+ vw left injury     Labs Reviewed - No data to display  Time reflects when diagnosis was documented in both MDM as applicable and the Disposition within this note       Time User Action Codes Description Comment    5/15/2022  1:01 PM Zainab Schofield Add [P83 [id number]] Acute pain of left knee           ED Disposition       ED Disposition   Discharge    Condition   Stable    Date/Time   Nayeli May 15, 2022  1:00 PM    [address] discharge to home/self care  Results of completed tests discussed  Return to ER precautions given, verbal and written, and questions answered satisfactorily                         Follow-up Information       Follow up With Specialties Details Why Contact Info Additional 1305 [facility] Call in 1 day For specialty evaluation and/or treatment [address] [phone]  [phone] 30 [facility], [address] [place] [address], [address]  Use Entrance A           Discharge Medication List as of 5/15/2022  1:01 PM        CONTINUE these medications which have NOT CHANGED    Details   ibuprofen (MOTRIN) 200 mg tablet Take 400 mg by mouth every 6 (six) hours as needed for mild pain, Historical Med      naproxen (NAPROSYN) 500 mg tablet Take 1 tablet (500 mg total) by mouth 2 (two) times a day with meals for 7 days, Starting Mon 10/5/2020, Until Mon 10/12/2020, Normal      oxyCODONE-acetaminophen (PERCOCET) 5-325 mg per tablet Take 1 tablet by mouth daily at bedtime as needed for moderate pain for up to 3 dosesMax Daily Amount: 1 tablet, Starting Mon 10/5/2020, Normal           No discharge procedures on file  Prior to Admission Medications   Prescriptions Last Dose Informant Patient Reported? Taking?   ibuprofen (MOTRIN) 200 mg tablet  Self Yes No   Sig: Take 400 mg by mouth every 6 (six) hours as needed for mild pain   naproxen (NAPROSYN) 500 mg tablet   No No   Sig: Take 1 tablet (500 mg total) by mouth 2 (two) times a day with meals for 7 days   oxyCODONE-acetaminophen (PERCOCET) 5-325 mg per tablet   No No   Sig: Take 1 tablet by mouth daily at bedtime as needed for moderate pain for up to 3 dosesMax Daily Amount: 1 tablet      Facility-Administered Medications: None       Portions of the record may have been created with voice recognition software  Occasional wrong word or "sound a like" substitutions may have occurred due to the inherent limitations of voice recognition software  Read the chart carefully and recognize, using context, where substitutions have occurred      Electronically signed by:  Germaine Powell Jonas Holland

## 2022-05-15 NOTE — DISCHARGE INSTRUCTIONS
You were seen today in the Emergency Department  Please follow up with your Primary Care Provider in the next 1-2 days to recheck your symptoms and to follow up on your visit to the Emergency Department today  Please also follow up with the specialists to whom I have referred you as directed on this page  Please return to the Emergency Department if you have fevers, chills, chest pain, shortness of breath, are unable to eat or drink, or have any other symptoms that concern you  Please look this over and let your nurse and/or me know if you have any further questions before you leave